# Patient Record
Sex: MALE | Race: ASIAN | NOT HISPANIC OR LATINO | URBAN - METROPOLITAN AREA
[De-identification: names, ages, dates, MRNs, and addresses within clinical notes are randomized per-mention and may not be internally consistent; named-entity substitution may affect disease eponyms.]

---

## 2021-03-26 ENCOUNTER — BULK ORDERING (OUTPATIENT)
Dept: CASE MANAGEMENT | Facility: OTHER | Age: 56
End: 2021-03-26

## 2021-03-26 DIAGNOSIS — Z23 IMMUNIZATION DUE: ICD-10-CM

## 2021-03-28 ENCOUNTER — IMMUNIZATION (OUTPATIENT)
Dept: VACCINE CLINIC | Facility: HOSPITAL | Age: 56
End: 2021-03-28

## 2021-03-28 DIAGNOSIS — Z23 IMMUNIZATION DUE: ICD-10-CM

## 2021-03-28 PROCEDURE — 0001A: CPT | Performed by: INTERNAL MEDICINE

## 2021-03-28 PROCEDURE — 91300 HC SARSCOV02 VAC 30MCG/0.3ML IM: CPT | Performed by: INTERNAL MEDICINE

## 2021-04-18 ENCOUNTER — IMMUNIZATION (OUTPATIENT)
Dept: VACCINE CLINIC | Facility: HOSPITAL | Age: 56
End: 2021-04-18

## 2021-04-18 PROCEDURE — 91300 HC SARSCOV02 VAC 30MCG/0.3ML IM: CPT | Performed by: INTERNAL MEDICINE

## 2021-04-18 PROCEDURE — 0002A: CPT | Performed by: INTERNAL MEDICINE

## 2023-05-22 ENCOUNTER — OFFICE VISIT (OUTPATIENT)
Dept: ORTHOPEDIC SURGERY | Facility: CLINIC | Age: 58
End: 2023-05-22
Payer: COMMERCIAL

## 2023-05-22 VITALS — TEMPERATURE: 98.2 F | WEIGHT: 144 LBS | HEIGHT: 62 IN | BODY MASS INDEX: 26.5 KG/M2

## 2023-05-22 DIAGNOSIS — M76.62 ACHILLES TENDINITIS OF LEFT LOWER EXTREMITY: Primary | ICD-10-CM

## 2023-05-22 DIAGNOSIS — M77.52 BURSITIS OF LEFT ANKLE: ICD-10-CM

## 2023-05-22 DIAGNOSIS — R52 PAIN: ICD-10-CM

## 2023-05-22 RX ORDER — VALSARTAN 80 MG/1
80 TABLET ORAL DAILY
COMMUNITY

## 2023-05-22 RX ORDER — MELOXICAM 15 MG/1
15 TABLET ORAL DAILY
COMMUNITY

## 2023-05-22 NOTE — PROGRESS NOTES
General Exam    Patient: Gladys Winters    YOB: 1965    Medical Record Number: [id number]    Chief Complaints: Left ankle pain    History of Present Illness:     58 y.o. male patient who presents for family treatment left ankle pain.  This is second opinion he had been seen in podiatrist who he states said he had some arthritis he is tried anti-inflammatories and steroid injection.  Continue to have pain posteriorly about the heel and ankle area.  Has MRI report which mentions retro-Achilles bursitis.  No fractures or tendon tears noted about the foot or ankle no arthritis noted.    Denies any numbness or tingling.  Denies any fevers, cough or shortness of breath.    Allergies: No Known Allergies    Home Medications:      Current Outpatient Medications:   •  albuterol (PROVENTIL HFA;VENTOLIN HFA) 108 (90 BASE) MCG/ACT inhaler, Ventolin  (90 Base) MCG/ACT Inhalation Aerosol Solution; Patient Sig: Ventolin  (90 Base) MCG/ACT Inhalation Aerosol Solution ; 0; 03-Nov-2014; Active, Disp: , Rfl:   •  meloxicam (MOBIC) 15 MG tablet, Take 1 tablet by mouth Daily., Disp: , Rfl:   •  simvastatin (ZOCOR) 10 MG tablet, Take 1 tablet by mouth every night., Disp: 30 tablet, Rfl: 2  •  budesonide-formoterol (SYMBICORT) 80-4.5 MCG/ACT inhaler, 2 (two) times a day., Disp: , Rfl:   •  valsartan (DIOVAN) 80 MG tablet, Take 1 tablet by mouth Daily., Disp: , Rfl:     Past Medical History:   Diagnosis Date   • Fracture of ankle July2021       Past Surgical History:   Procedure Laterality Date   • NO PAST SURGERIES         Social History     Occupational History   • Not on file   Tobacco Use   • Smoking status: Never   • Smokeless tobacco: Never   Vaping Use   • Vaping Use: Never used   Substance and Sexual Activity   • Alcohol use: Yes     Comment: Socially.   • Drug use: Never   • Sexual activity: Never      Social History     Social History Narrative   • Not on file       Family History   Problem Relation Age of  "Onset   • Stroke Father        Review of Systems:      Constitutional: Denies fever, shaking or chills         All other pertinent positives and negatives as noted above in HPI.    Physical Exam: 58 y.o. male    Vitals:    05/22/23 0910   Temp: 98.2 °F (36.8 °C)   TempSrc: Temporal   Weight: 65.3 kg (144 lb)   Height: 157.5 cm (62\")       General:  Patient is awake and alert.  Appears in no acute distress or discomfort.      Musculoskeletal/Extremities:    Left lower extremity tenderness along the insertion of the Achilles.  Ankle range of motion overall full and painless.  Compartment soft compressible.         Radiology:     3 views left ankle AP, lateral and oblique taken reviewed overall joint space appears well-maintained.  No acute osseous abnormality appreciated.      No previous films    MRI as noted in H&P      Assessment: Left Achilles tendinitis, bursitis      Plan:      Discussed the findings with the patient seems to be more soft tissue in nature recommend rest, ice, active modification, anti-inflammatory medication, heel pad, stretching and formal physical therapy.  If symptoms or not improving next 6 weeks recommend following up with my foot and ankle partner.           We will plan for follow up as above.    All questions were answered.  Patient understands and agrees with the plan.    Antonio Rodriguez MD    05/22/2023    CC to Alejandra Alvarez PA        "

## 2023-05-25 ENCOUNTER — PATIENT ROUNDING (BHMG ONLY) (OUTPATIENT)
Dept: ORTHOPEDIC SURGERY | Facility: CLINIC | Age: 58
End: 2023-05-25
Payer: COMMERCIAL

## 2023-05-25 NOTE — PROGRESS NOTES
A [employer] Message has been sent to the patient for PATIENT ROUNDING with [facility]

## 2023-07-13 PROBLEM — M77.52 RETROCALCANEAL BURSITIS (BACK OF HEEL), LEFT: Status: ACTIVE | Noted: 2023-07-13

## 2023-07-13 PROBLEM — M76.62 ACHILLES TENDINITIS OF LEFT LOWER EXTREMITY: Status: ACTIVE | Noted: 2023-07-13

## 2023-08-08 ENCOUNTER — APPOINTMENT (OUTPATIENT)
Dept: OTHER | Facility: HOSPITAL | Age: 58
End: 2023-08-08
Payer: COMMERCIAL

## 2023-08-08 ENCOUNTER — HOSPITAL ENCOUNTER (OUTPATIENT)
Dept: MRI IMAGING | Facility: HOSPITAL | Age: 58
Discharge: HOME | End: 2023-08-08
Payer: COMMERCIAL

## 2023-08-08 DIAGNOSIS — M76.62 ACHILLES TENDINITIS OF LEFT LOWER EXTREMITY: ICD-10-CM

## 2023-08-08 DIAGNOSIS — Z09 FOLLOW-UP EXAM: ICD-10-CM

## 2023-08-08 DIAGNOSIS — M77.52 RETROCALCANEAL BURSITIS (BACK OF HEEL), LEFT: ICD-10-CM

## 2023-08-08 PROCEDURE — 73721 MRI JNT OF LWR EXTRE W/O DYE: CPT

## 2023-08-14 ENCOUNTER — OFFICE VISIT (OUTPATIENT)
Dept: ORTHOPEDIC SURGERY | Facility: CLINIC | Age: 58
End: 2023-08-14
Payer: COMMERCIAL

## 2023-08-14 VITALS — TEMPERATURE: 98.2 F | BODY MASS INDEX: 26.31 KG/M2 | WEIGHT: 143 LBS | HEIGHT: 62 IN

## 2023-08-14 DIAGNOSIS — M76.62 ACHILLES TENDINITIS OF LEFT LOWER EXTREMITY: ICD-10-CM

## 2023-08-14 DIAGNOSIS — M77.52 BURSITIS OF LEFT ANKLE: Primary | ICD-10-CM

## 2023-08-14 DIAGNOSIS — M77.52 RETROCALCANEAL BURSITIS (BACK OF HEEL), LEFT: ICD-10-CM

## 2023-08-14 PROCEDURE — 99214 OFFICE O/P EST MOD 30 MIN: CPT | Performed by: ORTHOPAEDIC SURGERY

## 2023-08-14 RX ORDER — MELOXICAM 15 MG/1
TABLET ORAL
Qty: 30 TABLET | Refills: 1 | Status: SHIPPED | OUTPATIENT
Start: 2023-08-14

## 2023-08-16 NOTE — PROGRESS NOTES
"Ankle Follow Up      Patient: Gladys Winters    YOB: 1965 58 y.o. male    Chief Complaints: Ankle pain    History of Present Illness:Patient was seen initially on 7/13/2023 reports onset approximately 2 years ago with pain that seems to isolate to the inferolateral aspect of the left hindfoot just behind the peroneal tendons at the calcaneus.  He did not report anterior ankle pain or pain on the Achilles or medial pain.  He evidently saw a podiatrist in fall of 2022 and had an MRI which had shown some mild retrocalcaneal bursitis.  He evidently reports he had some injections and was started on meloxicam without improvement.     He saw Dr. Rodriguez on 5/22/2023 and had been doing some therapy at [facility] physical therapy since then.     He reports that pain was worse when he first got up in the morning and has intermittent pain with walking but also has periods when he was walking when it was not particularly painful.  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    I felt it was odd that is localized such as specific area and could be some persistent inflammation retrocalcaneal bursa or developing ganglion and did not elicit any neuritic symptoms of the sural nerve.  He was sent for MRI for repeat evaluation he was also fitted with a heel lift.    He is seen back today stating that the heel lift does help a little bit.  It says it does not really bother him with activities of daily living but mainly bothers him if he walks more than a mile or 2.  He reports that meloxicam has helped.  He did do some physical therapy previously without much relief and does not describe any neuritic symptoms.      HPI    ROS: ankle pain  Past Medical History:   Diagnosis Date    Fracture of ankle July2021       Physical Exam:   Vitals:    08/14/23 0818   Temp: 98.2 øF (36.8 øC)   Weight: 64.9 kg (143 lb)   Height: 157.5 cm (62\")   PainSc:   5     Well developed with normal mood.  On exam he has pain isolates to the " "posterior lateral aspect of the left hindfoot just along the superior lateral border of the calcaneus.  He was nontender along peroneal tendons and did not appreciably have pain along the Achilles.      Radiology: MRI films and report of the left hindfoot dated 8/3/2023 reviewed which shows the Achilles tendon appears normal.  There was accessory navicular with normal marrow signal.  There was edema in the fat pad anterior to the Achilles tendon (Kager's fat pad) more pronounced today than on previous exam but no focal cyst or mass lesion.  There is very tiny volume of fluid observed in the retrocalcaneal bursa.  Flexion extensor tendons appeared normal.  There is mild edema in the fat pad of the heel superficial to the normal thickness plantar aponeurosis    MRI films and report of the left ankle dated 9/24/2022 from [facility] imaging ordered by  [facility] reviewed which shows mild retrocalcaneal bursitis deep to the Achilles tendon insertion peritendinous edema/inflammation deep to the more superior aspect of the Achilles within the posterior aspect of Kager's fat.  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         Assessment/Plan:  1.  Left inferolateral hindfoot pain with possible worsening retrocalcaneal bursitis with resolved previous anterior Achilles peritendinous inflammation  2.  Left hindfoot edema anterior to the Achilles tendon in Kager's fat pad without focal cyst or mass lesion  3.  Asymptomatic left accessory navicular  4.  Asymptomatic mild left sinus tarsi edema on previous MRI        We discussed treatment options going forward I do not see any " readily reliable surgical treatment for him and would certainly want to continue nonoperative measures prior to any type of excision of Cager's fat pad.    We will start him back on meloxicam 15 mg daily and prescribed compounding cream to apply the area several times daily    He was instructed on heel cord stretching exercises and instruction she was provided and demonstrated for him and we will get him back into some physical therapy    We will see him back in 6 weeks to assess progress and determine treatment course going forward may consider repeat injection

## 2023-09-18 DIAGNOSIS — M76.62 ACHILLES TENDINITIS OF LEFT LOWER EXTREMITY: ICD-10-CM

## 2023-09-18 DIAGNOSIS — M77.52 BURSITIS OF LEFT ANKLE: ICD-10-CM

## 2023-09-18 DIAGNOSIS — M77.52 RETROCALCANEAL BURSITIS (BACK OF HEEL), LEFT: Primary | ICD-10-CM

## 2023-09-25 ENCOUNTER — OFFICE VISIT (OUTPATIENT)
Dept: ORTHOPEDIC SURGERY | Facility: CLINIC | Age: 58
End: 2023-09-25
Payer: COMMERCIAL

## 2023-09-25 VITALS — HEIGHT: 62 IN | WEIGHT: 143.2 LBS | TEMPERATURE: 98 F | BODY MASS INDEX: 26.35 KG/M2

## 2023-09-25 DIAGNOSIS — M76.62 ACHILLES TENDINITIS OF LEFT LOWER EXTREMITY: ICD-10-CM

## 2023-09-25 DIAGNOSIS — M77.52 RETROCALCANEAL BURSITIS (BACK OF HEEL), LEFT: Primary | ICD-10-CM

## 2023-09-25 DIAGNOSIS — M77.52 BURSITIS OF LEFT ANKLE: ICD-10-CM

## 2023-09-25 PROCEDURE — 99214 OFFICE O/P EST MOD 30 MIN: CPT | Performed by: ORTHOPAEDIC SURGERY

## 2023-09-25 NOTE — PROGRESS NOTES
Ankle Follow Up      Patient: Gladys Bun    YOB: 1965 58 y.o. male    Chief Complaints: Ankle felt better with Mobic    History of Present Illness:Patient was seen initially on 7/13/2023 reports onset approximately 2 years ago with pain that seems to isolate to the inferolateral aspect of the left hindfoot just behind the peroneal tendons at the calcaneus.  He did not report anterior ankle pain or pain on the Achilles or medial pain.  He evidently saw a podiatrist in fall of 2022 and had an MRI which had shown some mild retrocalcaneal bursitis.  He evidently reports he had some injections and was started on meloxicam without improvement.     He saw Dr. Rodriguez on 5/22/2023 and had been doing some therapy at [facility] physical therapy since then.     He reports that pain was worse when he first got up in the morning and has intermittent pain with walking but also has periods when he was walking when it was not particularly painful.  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     I felt it was odd that is localized such as specific area and could be some persistent inflammation retrocalcaneal bursa or developing ganglion and did not elicit any neuritic symptoms of the sural nerve.  He was sent for MRI for repeat evaluation he was also fitted with a heel lift.     Patient was seen on 8/14/2023 stating that the heel lift did help a little bit.  He reported that his hindfoot did not really bother him with activities of daily living but mainly bothered him if he walks more than a mile or 2.  He reported that meloxicam had helped.  He did do some physical therapy previously without much relief and does not describe any neuritic symptoms.    We discussed treatment at that time and denies any readily reliable surgical treatment and decision made to continue with nonoperative treatment.  He was started back on meloxicam 15 mg daily and prescribed compounding cream.  He did struggle on heel cord stretching  "exercises and referred back to therapy    Patient is seen back today stating that he felt better when he was on the meloxicam.  He tried coming off it was some recurrent symptoms and the compounding cream we prescribed and really helped.  Still reports pain that mainly isolates to the inferolateral aspect of the left hindfoot worse with walking activities.  He said he has not really been doing his heel cord stretching exercises and only did 5 sessions of physical therapy as that was all that was authorized but was instructed on home exercises.  HPI    ROS: ankle pain  Past Medical History:   Diagnosis Date    Fracture of ankle July2021       Physical Exam:   Vitals:    09/25/23 0806   Temp: 98 °F (36.7 °C)   Weight: 65 kg (143 lb 3.2 oz)   Height: 157.5 cm (62\")   PainSc:   8   PainLoc: Foot     Well developed with normal mood.  On exam he has persistent pain isolated to the posterior lateral aspect left hindfoot and along the superior lateral border of the calcaneus.  Without tenderness along the Achilles or medial ankle nor along the peroneal tendons.      Radiology: MRI films and report of the left hindfoot dated 8/3/2023 reviewed which shows the Achilles tendon appears normal.  There was accessory navicular with normal marrow signal.  There was edema in the fat pad anterior to the Achilles tendon (Kager's fat pad) more pronounced today than on previous exam but no focal cyst or mass lesion.  There is very tiny volume of fluid observed in the retrocalcaneal bursa.  Flexion extensor tendons appeared normal.  There is mild edema in the fat pad of the heel superficial to the normal thickness plantar aponeurosis     MRI films and report of the left ankle dated 9/24/2022 from [facility] imaging ordered by  [facility] reviewed which shows mild retrocalcaneal bursitis deep to the Achilles tendon insertion peritendinous edema/inflammation deep to the more superior aspect of the Achilles within the posterior aspect of " "Kager's fat.  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      Assessment/Plan:  1.  Left inferolateral hindfoot pain with possible worsening retrocalcaneal bursitis with resolved previous anterior Achilles peritendinous inflammation  2.  Left hindfoot edema anterior to the Achilles tendon in Kager's fat pad without focal cyst or mass lesion  3.  Asymptomatic left accessory navicular  4.  Asymptomatic mild left sinus tarsi edema on previous MRI      We discussed treatment going forward and he does not want anything done from a surgical standpoint certainly could tell him if that would reliably help    We discussed repeat injection today as he had one previously by the podiatrist which he did not want to do today.  We will have him increase his heel cord stretching to at least 3-4 times a day as he has not really been doing as such if any.  He will continue with home therapy exercises and we will try different compounding cream.  He may use the meloxicam as needed but counseled him to avoid taking it every day and if needs long-term use of this would defer to his PCP for further prescriptions for monitoring of his kidney and liver function    We will see him back in about 6 weeks.  No x-rays less is having increased pain.  "

## 2023-09-26 ENCOUNTER — TELEPHONE (OUTPATIENT)
Dept: ORTHOPEDIC SURGERY | Facility: CLINIC | Age: 58
End: 2023-09-26
Payer: COMMERCIAL

## 2023-11-06 ENCOUNTER — OFFICE VISIT (OUTPATIENT)
Dept: ORTHOPEDIC SURGERY | Facility: CLINIC | Age: 58
End: 2023-11-06
Payer: COMMERCIAL

## 2023-11-06 VITALS — BODY MASS INDEX: 26.28 KG/M2 | TEMPERATURE: 97.1 F | WEIGHT: 142.8 LBS | HEIGHT: 62 IN

## 2023-11-06 DIAGNOSIS — M76.62 TENDONITIS, ACHILLES, LEFT: ICD-10-CM

## 2023-11-06 DIAGNOSIS — M77.52 RETROCALCANEAL BURSITIS (BACK OF HEEL), LEFT: Primary | ICD-10-CM

## 2023-11-06 DIAGNOSIS — R52 PAIN: ICD-10-CM

## 2023-11-06 DIAGNOSIS — M77.52 BURSITIS OF LEFT ANKLE: ICD-10-CM

## 2023-11-06 RX ORDER — METHYLPREDNISOLONE ACETATE 80 MG/ML
INJECTION, SUSPENSION INTRA-ARTICULAR; INTRALESIONAL; INTRAMUSCULAR; SOFT TISSUE
Qty: 0.5 ML
Start: 2023-11-06

## 2023-11-06 RX ORDER — LIDOCAINE HYDROCHLORIDE 10 MG/ML
INJECTION, SOLUTION EPIDURAL; INFILTRATION; INTRACAUDAL; PERINEURAL
Qty: 3 ML
Start: 2023-11-06

## 2023-11-06 RX ORDER — FLUTICASONE PROPIONATE AND SALMETEROL XINAFOATE 115; 21 UG/1; UG/1
2 AEROSOL, METERED RESPIRATORY (INHALATION) 2 TIMES DAILY
COMMUNITY
Start: 2023-08-22

## 2023-11-06 NOTE — PROGRESS NOTES
Ankle Follow Up      Patient: Gladys Winters    YOB: 1965 58 y.o. male    Chief Complaints: Ankle sore but meloxicam helps    History of Present Illness:Patient was seen initially on 7/13/2023 reports onset approximately 2 years ago with pain that seems to isolate to the inferolateral aspect of the left hindfoot just behind the peroneal tendons at the calcaneus.  He did not report anterior ankle pain or pain on the Achilles or medial pain.  He evidently saw a podiatrist in fall of 2022 and had an MRI which had shown some mild retrocalcaneal bursitis.  He evidently reports he had some injections and was started on meloxicam without improvement.     He saw Dr. Rodriguez on 5/22/2023 and had been doing some therapy at [facility] physical therapy since then.     He reports that pain was worse when he first got up in the morning and has intermittent pain with walking but also has periods when he was walking when it was not particularly painful.  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     I felt it was odd that is localized such as specific area and could be some persistent inflammation retrocalcaneal bursa or developing ganglion and did not elicit any neuritic symptoms of the sural nerve.  He was sent for MRI for repeat evaluation he was also fitted with a heel lift.     Patient was seen on 8/14/2023 stating that the heel lift did help a little bit.  He reported that his hindfoot did not really bother him with activities of daily living but mainly bothered him if he walks more than a mile or 2.  He reported that meloxicam had helped.  He did do some physical therapy previously without much relief and does not describe any neuritic symptoms.     We discussed treatment at that time and denies any readily reliable surgical treatment and decision made to continue with nonoperative treatment.  He was started back on meloxicam 15 mg daily and prescribed compounding cream.  He did struggle on heel cord stretching  "exercises and referred back to therapy     Patient was seen on 9/25/2023 stating that he felt better when he was on the meloxicam.  He t had tried coming off it with some recurrent symptoms and the compounding cream we prescribed had not helped.  He still reported pain that mainly isolated to the inferolateral aspect of the left hindfoot worse with walking activities.  He reported that he had not really been doing his heel cord stretching exercises and only did 5 sessions of physical therapy as that was all that was authorized but was instructed on home exercises.    We discussed treatment going forward he did not want anything done from a surgical standpoint and could not tell them reliably if that would help.  We discussed repeat injection as he had one previously from the podiatrist which she did not want to do that day.  Instructions were given to increase heel cord stretching to at least 3 or 4 times a day and continue with home exercises and we prescribed a different compounding cream.  He was instructed to use the meloxicam as needed for calcium to avoid taking it every day and if needed long-term use of this but defer to his PCP for further prescriptions for monitoring of his kidney and liver function.    Patient is seen back today reporting persistent pain in the inferolateral aspect of her left hindfoot directly over the calcaneus and somewhat to the retrocalcaneal bursal area laterally at the hindfoot but not at the Achilles insertion.  He has been taking Mobic nearly every day but reports relief with that.  Has been stretching at least 3 times a day.  The new compounding cream did not help.  HPI    ROS: ankle pain  Past Medical History:   Diagnosis Date    Fracture of ankle July2021       Physical Exam:   Vitals:    11/06/23 0807   Temp: 97.1 °F (36.2 °C)   Weight: 64.8 kg (142 lb 12.8 oz)   Height: 157.5 cm (62\")   PainSc:   8   PainLoc: Foot     Well developed with normal mood.  On exam he remains " "tender over the inferolateral aspect the left hindfoot along the superior border of the calcaneus at the hindfoot in the slightly retromalleolar area with less pain directly on the retrocalcaneal bursa none along the Achilles insertion.      Radiology: 3 views of the left foot ordered evaluate pain reviewed and compared to prior x-rays of the ankle.  There is a large accessory navicular.  I do not see any change in alignment to the calcaneus any lesions or evidence of fracture.      MRI films and report of the left hindfoot dated 8/3/2023 reviewed which shows the Achilles tendon appears normal.  There was accessory navicular with normal marrow signal.  There was edema in the fat pad anterior to the Achilles tendon (Kager's fat pad) more pronounced today than on previous exam but no focal cyst or mass lesion.  There is very tiny volume of fluid observed in the retrocalcaneal bursa.  Flexion extensor tendons appeared normal.  There is mild edema in the fat pad of the heel superficial to the normal thickness plantar aponeurosis     MRI films and report of the left ankle dated 9/24/2022 from [facility] imaging ordered by  [facility] reviewed which shows mild retrocalcaneal bursitis deep to the Achilles tendon insertion peritendinous edema/inflammation deep to the more superior aspect of the Achilles within the posterior aspect of Kager's fat.  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      Assessment/Plan:    1.  Left inferolateral hindfoot pain with possible worsening retrocalcaneal bursitis with resolved previous anterior Achilles peritendinous inflammation  2.  Left " hindfoot edema anterior to the Achilles tendon in Kager's fat pad without focal cyst or mass lesion  3.  Asymptomatic left accessory navicular  4.  Asymptomatic mild left sinus tarsi edema on previous MRI    We discussed treatment going forward and pain seems to isolate directly anterolaterally over the superior border the calcaneus and somewhat to the retrocalcaneal bursal    We discussed treatment going forward and certainly could not tell him that surgery would reliably relieve symptoms and could be potentially made worse and we will hold off on that.  We discussed other treatment options and he will continue with meloxicam but understands he needs to get further refills from his PCP so the kidney and liver margins can be monitored.  He will continue with stretching.    We discussed other treatment options and after verbal consent and sterile preparation we discussed the risks which can include infection the area of symptoms were injected in the retrocalcaneal bursa and directly over the inferolateral hindfoot with 3 cc of 1% Xylocaine ( NDC 98118-117-62 lot # [id number] exp 3/1/27) and 1/2 cc of Depo-Medrol containing 80 mg/cc ( NDC 26517-117-61 lot # [id number] exp 5/1/25)    Patient tolerated the injection well and postinjection instructions were provided.    We will see him back in 8 to 10 weeks to assess progress and determine treatment course going forward.  X-ray of left heel if having increased pain.

## 2024-01-04 ENCOUNTER — OFFICE VISIT (OUTPATIENT)
Dept: ORTHOPEDIC SURGERY | Facility: CLINIC | Age: 59
End: 2024-01-04
Payer: COMMERCIAL

## 2024-01-04 VITALS — BODY MASS INDEX: 26.39 KG/M2 | TEMPERATURE: 99.5 F | WEIGHT: 143.4 LBS | HEIGHT: 62 IN

## 2024-01-04 DIAGNOSIS — M77.52 RETROCALCANEAL BURSITIS (BACK OF HEEL), LEFT: ICD-10-CM

## 2024-01-04 DIAGNOSIS — R52 PAIN: Primary | ICD-10-CM

## 2024-01-04 DIAGNOSIS — M76.62 TENDONITIS, ACHILLES, LEFT: ICD-10-CM

## 2024-01-04 PROCEDURE — 99213 OFFICE O/P EST LOW 20 MIN: CPT | Performed by: ORTHOPAEDIC SURGERY

## 2024-01-04 NOTE — PROGRESS NOTES
Ankle Follow Up      Patient: Gladys Bun    YOB: 1965 58 y.o. male    Chief Complaints: Injection helped    History of Present Illness:Patient was seen initially on 7/13/2023 reports onset approximately 2 years ago with pain that seems to isolate to the inferolateral aspect of the left hindfoot just behind the peroneal tendons at the calcaneus.  He did not report anterior ankle pain or pain on the Achilles or medial pain.  He evidently saw a podiatrist in fall of 2022 and had an MRI which had shown some mild retrocalcaneal bursitis.  He evidently reports he had some injections and was started on meloxicam without improvement.     He saw Dr. Rodriguez on 5/22/2023 and had been doing some therapy at [facility] physical therapy since then.     He reports that pain was worse when he first got up in the morning and has intermittent pain with walking but also has periods when he was walking when it was not particularly painful.  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     I felt it was odd that is localized such as specific area and could be some persistent inflammation retrocalcaneal bursa or developing ganglion and did not elicit any neuritic symptoms of the sural nerve.  He was sent for MRI for repeat evaluation he was also fitted with a heel lift.     Patient was seen on 8/14/2023 stating that the heel lift did help a little bit.  He reported that his hindfoot did not really bother him with activities of daily living but mainly bothered him if he walks more than a mile or 2.  He reported that meloxicam had helped.  He did do some physical therapy previously without much relief and does not describe any neuritic symptoms.     We discussed treatment at that time and denies any readily reliable surgical treatment and decision made to continue with nonoperative treatment.  He was started back on meloxicam 15 mg daily and prescribed compounding cream.  He did struggle on heel cord stretching exercises and  referred back to therapy     Patient was seen on 9/25/2023 stating that he felt better when he was on the meloxicam.  He t had tried coming off it with some recurrent symptoms and the compounding cream we prescribed had not helped.  He still reported pain that mainly isolated to the inferolateral aspect of the left hindfoot worse with walking activities.  He reported that he had not really been doing his heel cord stretching exercises and only did 5 sessions of physical therapy as that was all that was authorized but was instructed on home exercises.     We discussed treatment going forward he did not want anything done from a surgical standpoint and could not tell them reliably if that would help.  We discussed repeat injection as he had one previously from the podiatrist which she did not want to do that day.  Instructions were given to increase heel cord stretching to at least 3 or 4 times a day and continue with home exercises and we prescribed a different compounding cream.  He was instructed to use the meloxicam as needed for calcium to avoid taking it every day and if needed long-term use of this but defer to his PCP for further prescriptions for monitoring of his kidney and liver function.     Patient was seen on 11/6/2023 reporting persistent pain in the inferolateral aspect of her left hindfoot directly over the calcaneus and somewhat to the retrocalcaneal bursal area laterally at the hindfoot but not at the Achilles insertion.  He had been taking Mobic nearly every day and reported relief with that.  He had been stretching at least 3 times a day.  The new compounding cream did not help.    All we discussed treatment options at that time and no surgical recommendations were made.  The area of discomfort was injected and instructed to continue with meloxicam but instructed that further refills need to come from his PCP.    Patient is seen back today stating the injection helped.  Did not have any pain to  "the area anymore other than occasional discomfort in the morning but no pain with walking or pressing on that area.  He has been using some compounding cream which seems to help some now and current pain is rated 0 out of 10  HPI    ROS: No ankle pain  Past Medical History:   Diagnosis Date    Fracture of ankle July2021       Physical Exam:   Vitals:    01/04/24 0921   Temp: 99.5 °F (37.5 °C)   Weight: 65 kg (143 lb 6.4 oz)   Height: 157.5 cm (62\")   PainSc: 0-No pain     Well developed with normal mood.  On exam although noted to elicit focal tenderness over the inferolateral aspect left hindfoot along the superior border the calcaneus at the hindfoot noted to the retromalleolar area or retrocalcaneal bursa.      Radiology: 2 views of the left heel ordered evaluate pain reviewed and compared to prior x-rays.  I do not see any evidence of fracture or change in alignment compared to previous x-rays.    MRI films and report of the left hindfoot dated 8/3/2023 reviewed which shows the Achilles tendon appears normal.  There was accessory navicular with normal marrow signal.  There was edema in the fat pad anterior to the Achilles tendon (Kager's fat pad) more pronounced today than on previous exam but no focal cyst or mass lesion.  There is very tiny volume of fluid observed in the retrocalcaneal bursa.  Flexion extensor tendons appeared normal.  There is mild edema in the fat pad of the heel superficial to the normal thickness plantar aponeurosis     MRI films and report of the left ankle dated 9/24/2022 from [facility] ordered by  [facility] reviewed which shows mild retrocalcaneal bursitis deep to the Achilles tendon insertion peritendinous edema/inflammation deep to the more superior aspect of the Achilles within the posterior aspect of Kager's fat.  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      Assessment/Plan: 1 .  Left inferolateral hindfoot pain with possible worsening retrocalcaneal bursitis with resolved previous anterior Achilles peritendinous inflammation  2.  Left hindfoot edema anterior to the Achilles tendon in Kager's fat pad without focal cyst or mass lesion  3.  Asymptomatic left accessory navicular  4.  Asymptomatic mild left sinus tarsi edema on previous MRI    We discussed treatment going forward and encouraged that he is improved with certain I recommend a surgical treatment    Continue with stretching exercises use compounding cream if needed and if has persistent recurrent symptoms he will let me know otherwise by mutual agreement I will see him back as needed  "

## 2024-06-03 ENCOUNTER — OFFICE VISIT (OUTPATIENT)
Dept: ORTHOPEDIC SURGERY | Facility: CLINIC | Age: 59
End: 2024-06-03
Payer: COMMERCIAL

## 2024-06-03 VITALS — HEIGHT: 62 IN | WEIGHT: 140.4 LBS | BODY MASS INDEX: 25.83 KG/M2 | TEMPERATURE: 97.5 F

## 2024-06-03 DIAGNOSIS — M77.52 RETROCALCANEAL BURSITIS (BACK OF HEEL), LEFT: Primary | ICD-10-CM

## 2024-06-03 DIAGNOSIS — M77.52 BURSITIS OF LEFT ANKLE: ICD-10-CM

## 2024-06-03 DIAGNOSIS — M76.62 TENDONITIS, ACHILLES, LEFT: ICD-10-CM

## 2024-06-03 PROCEDURE — 99214 OFFICE O/P EST MOD 30 MIN: CPT | Performed by: ORTHOPAEDIC SURGERY

## 2024-06-03 PROCEDURE — 20605 DRAIN/INJ JOINT/BURSA W/O US: CPT | Performed by: ORTHOPAEDIC SURGERY

## 2024-06-04 RX ORDER — METHYLPREDNISOLONE ACETATE 80 MG/ML
INJECTION, SUSPENSION INTRA-ARTICULAR; INTRALESIONAL; INTRAMUSCULAR; SOFT TISSUE
Start: 2024-06-04

## 2024-06-04 RX ORDER — LIDOCAINE HYDROCHLORIDE 10 MG/ML
INJECTION, SOLUTION EPIDURAL; INFILTRATION; INTRACAUDAL; PERINEURAL
Qty: 2 ML
Start: 2024-06-04

## 2024-06-04 NOTE — PROGRESS NOTES
Ankle Follow Up      Patient: Gladys Bun    YOB: 1965 59 y.o. male    Chief Complaints: Ankle is sore again    History of Present Illness::Patient was seen initially on 7/13/2023 reports onset approximately 2 years ago with pain that seemed to isolate to the inferolateral aspect of the left hindfoot just behind the peroneal tendons at the calcaneus.  He did not report anterior ankle pain or pain on the Achilles or medial pain.  He evidently saw a podiatrist in fall of 2022 and had an MRI which had shown some mild retrocalcaneal bursitis.  He evidently reports he had some injections and was started on meloxicam without improvement.     He saw Dr. Rodriguez on 5/22/2023 and had been doing some therapy at [facility] physical therapy since then.     He reports that pain was worse when he first got up in the morning and has intermittent pain with walking but also has periods when he was walking when it was not particularly painful.  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     I felt it was odd that is localized such as specific area and could be some persistent inflammation retrocalcaneal bursa or developing ganglion and did not elicit any neuritic symptoms of the sural nerve.  He was sent for MRI for repeat evaluation he was also fitted with a heel lift.     Patient was seen on 8/14/2023 stating that the heel lift did help a little bit.  He reported that his hindfoot did not really bother him with activities of daily living but mainly bothered him if he walks more than a mile or 2.  He reported that meloxicam had helped.  He did do some physical therapy previously without much relief and does not describe any neuritic symptoms.     We discussed treatment at that time and denies any readily reliable surgical treatment and decision made to continue with nonoperative treatment.  He was started back on meloxicam 15 mg daily and prescribed compounding cream.  He did struggle on heel cord stretching exercises  and referred back to therapy     Patient was seen on 9/25/2023 stating that he felt better when he was on the meloxicam.  He t had tried coming off it with some recurrent symptoms and the compounding cream we prescribed had not helped.  He still reported pain that mainly isolated to the inferolateral aspect of the left hindfoot worse with walking activities.  He reported that he had not really been doing his heel cord stretching exercises and only did 5 sessions of physical therapy as that was all that was authorized but was instructed on home exercises.     We discussed treatment going forward he did not want anything done from a surgical standpoint and could not tell them reliably if that would help.  We discussed repeat injection as he had one previously from the podiatrist which she did not want to do that day.  Instructions were given to increase heel cord stretching to at least 3 or 4 times a day and continue with home exercises and we prescribed a different compounding cream.  He was instructed to use the meloxicam as needed for calcium to avoid taking it every day and if needed long-term use of this but defer to his PCP for further prescriptions for monitoring of his kidney and liver function.     Patient was seen on 11/6/2023 reporting persistent pain in the inferolateral aspect of her left hindfoot directly over the calcaneus and somewhat to the retrocalcaneal bursal area laterally at the hindfoot but not at the Achilles insertion.  He had been taking Mobic nearly every day and reported relief with that.  He had been stretching at least 3 times a day.  The new compounding cream did not help.     All we discussed treatment options at that time and no surgical recommendations were made.  The area of discomfort was injected and instructed to continue with meloxicam but instructed that further refills need to come from his PCP.     Patient was seen on 1/4/2024 stating the injection helped.  He did not have any  "pain to the area anymore other than occasional discomfort in the morning but no pain with walking or pressing on that area.  He had been using some compounding cream which seemed to help some at that time and current pain was rated 0 out of 10.    There is nothing that can improve with surgical treatment and instructed continue with stretching exercises and use of compounding cream if he had recurrent symptoms to let me know    Patient is seen back today stating that he ran out of compounding cream and has had recurrent symptoms of pain in the posterior lateral aspect of the left hindfoot isolating over the lateral aspect of the superior margin of the calcaneus.  HPI    ROS: ankle pain  Past Medical History:   Diagnosis Date    Fracture of ankle July2021       Physical Exam:   Vitals:    06/03/24 1345   Temp: 97.5 °F (36.4 °C)   Weight: 63.7 kg (140 lb 6.4 oz)   Height: 157.5 cm (62\")   PainSc:   8     Well developed with normal mood.  On exam he has moderate tenderness to palpation isolated to the posterolateral aspect of the left hindfoot at the superior margin of the lateral calcaneus and some to the retrocalcaneal bursal area no pain along the Achilles or peroneal tendons.      Radiology:MRI films and report of the left hindfoot dated 8/3/2023 reviewed which shows the Achilles tendon appears normal.  There was accessory navicular with normal marrow signal.  There was edema in the fat pad anterior to the Achilles tendon (Kager's fat pad) more pronounced today than on previous exam but no focal cyst or mass lesion.  There is very tiny volume of fluid observed in the retrocalcaneal bursa.  Flexion extensor tendons appeared normal.  There is mild edema in the fat pad of the heel superficial to the normal thickness plantar aponeurosis     MRI films and report of the left ankle dated 9/24/2022 from [facility] ordered by Dr.Lemmenes VERGARA reviewed which shows mild retrocalcaneal bursitis deep to the Achilles tendon " "insertion peritendinous edema/inflammation deep to the more superior aspect of the Achilles within the posterior aspect of Kager's fat.  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         Assessment/Plan:  1 .  Left inferolateral hindfoot pain with possible worsening retrocalcaneal bursitis with resolved previous anterior Achilles peritendinous inflammation  2.  Left hindfoot edema anterior to the Achilles tendon in Kager's fat pad without focal cyst or mass lesion  3.  Asymptomatic left accessory navicular  4.  Asymptomatic mild left sinus tarsi edema on previous MRI    We discussed treatment of symptoms that he has exacerbation of previous problems.  Again nothing I would recommend from a surgical standpoint.  I refilled his compounding cream and he requested repeat injection which I felt was reasonable.  After verbal consent and sterile preparation with discussion of risks which can include but are not limited to infection damage to blood vessels nerves or tendons and atrophy.  The area of the lateral hindfoot just along the superior margin of the calcaneus and retrocalcaneal bursal area were injected with 2 cc of 1% lidocaine ( [id number] 24241-607-13 lot# [id number] exp 5/1/27 ) and 1/2 cc of Depo-Medrol containing 80 mg/cc ( [id number] [address]-4 lot # [id number] exp 2/1/26) Postinjection instructions were reviewed with patient    He may advance activity slowly as tolerated and we will see him back as needed.  "

## 2024-10-03 DIAGNOSIS — M77.52 BURSITIS OF LEFT ANKLE: Primary | ICD-10-CM

## 2024-10-03 DIAGNOSIS — M76.62 ACHILLES TENDINITIS OF LEFT LOWER EXTREMITY: ICD-10-CM

## 2024-10-04 RX ORDER — MELOXICAM 15 MG/1
TABLET ORAL
Qty: 30 TABLET | Refills: 1 | OUTPATIENT
Start: 2024-10-04

## 2024-10-04 NOTE — TELEPHONE ENCOUNTER
Please let patient know that I would recommend that he get any refills from his PCP said the liver and kidney functions come monitored.

## 2024-11-26 ENCOUNTER — TELEPHONE (OUTPATIENT)
Dept: ORTHOPEDIC SURGERY | Facility: CLINIC | Age: 59
End: 2024-11-26

## 2024-11-26 RX ORDER — MELOXICAM 15 MG/1
TABLET ORAL
Qty: 30 TABLET | Refills: 1 | OUTPATIENT
Start: 2024-11-26

## 2024-11-26 NOTE — TELEPHONE ENCOUNTER
Please let patient know that I would recommend that he get any refills on this from his PCP so that kidney and liver functions can be monitored.

## 2024-11-26 NOTE — TELEPHONE ENCOUNTER
Caller: Gladys Winters    Relationship to patient: Self    Best call back number: [phone]    Patient is needing: PT IS CALLING TO SEE IF HE CAN GET A RX REFILL OF MELOXICAM .. THERE WAS A PRIOR MESSAGE IN THE CHART BUT I CANNOT RELAY TO THE PT.. PLEASE ADVISE..     [facility] [address] [place] [address], [place] - [address] - [phone]  - [phone]  [phone]

## 2024-12-03 DIAGNOSIS — M77.52 BURSITIS OF LEFT ANKLE: ICD-10-CM
